# Patient Record
Sex: FEMALE | Race: WHITE | NOT HISPANIC OR LATINO | RURAL
[De-identification: names, ages, dates, MRNs, and addresses within clinical notes are randomized per-mention and may not be internally consistent; named-entity substitution may affect disease eponyms.]

---

## 2020-12-13 ENCOUNTER — TELEPHONE (OUTPATIENT)
Dept: URGENT CARE | Facility: CLINIC | Age: 40
End: 2020-12-13

## 2020-12-13 NOTE — TELEPHONE ENCOUNTER
----- Message from BEBE Soriano sent at 12/13/2020  9:23 AM EST -----  Please inform patient of negative Covid result.  Please inform patient to quarantine at home for 14 days from last exposure to positive covid patient to monitor for symptoms. If symptoms develop, return to urgent care or primary care provider for recheck. Thanks!

## 2020-12-14 ENCOUNTER — TELEPHONE (OUTPATIENT)
Dept: URGENT CARE | Facility: CLINIC | Age: 40
End: 2020-12-14